# Patient Record
Sex: MALE | Race: WHITE | Employment: FULL TIME | URBAN - METROPOLITAN AREA
[De-identification: names, ages, dates, MRNs, and addresses within clinical notes are randomized per-mention and may not be internally consistent; named-entity substitution may affect disease eponyms.]

---

## 2019-11-08 ENCOUNTER — TRANSFERRED RECORDS (OUTPATIENT)
Dept: HEALTH INFORMATION MANAGEMENT | Facility: CLINIC | Age: 24
End: 2019-11-08

## 2020-10-09 ENCOUNTER — TRANSFERRED RECORDS (OUTPATIENT)
Dept: HEALTH INFORMATION MANAGEMENT | Facility: CLINIC | Age: 25
End: 2020-10-09

## 2020-10-26 ENCOUNTER — MEDICAL CORRESPONDENCE (OUTPATIENT)
Dept: HEALTH INFORMATION MANAGEMENT | Facility: CLINIC | Age: 25
End: 2020-10-26

## 2020-10-27 ENCOUNTER — TRANSCRIBE ORDERS (OUTPATIENT)
Dept: OTHER | Age: 25
End: 2020-10-27

## 2020-10-27 DIAGNOSIS — H93.232 HYPERACUSIS OF LEFT EAR: Primary | ICD-10-CM

## 2020-10-28 ENCOUNTER — TELEPHONE (OUTPATIENT)
Dept: OTOLARYNGOLOGY | Facility: CLINIC | Age: 25
End: 2020-10-28

## 2020-10-29 NOTE — TELEPHONE ENCOUNTER
FUTURE VISIT INFORMATION      FUTURE VISIT INFORMATION:    Date: 1/6/2021    Time: 10:45AM    Location: [facility]  REFERRAL INFORMATION:    Referring provider:  Pardeep Robles MD      Referring providers clinic:  [facility]     Reason for visit/diagnosis  Hyperacusis of left ear - referred to Dr. Gale by Dr. Pardeep Robles at [facility]    RECORDS REQUESTED FROM:       Clinic name Comments Records Status Imaging Status   [facility]  10/26/2020 referral from Pardeep Robles MD    10/12/2020 note from Pardeep Robles MD  Care Everywhere     Audiology [facility]   10/09/2020 audiogram  req 10/29    Received 11/5    Department of Otorhinolaryngology in [place], [place] 11/08/2019 note from Kanika Troncoso P.A.-C., M.S.    *req 11/8/2019 audio on 10/29 - received 11/9 Care Everywhere     [facility]  Imaging 10/21/2020 CT TEMPORAL BONES  Care everywhere  req 10/29 - PACS                 10/29/2020 12:35PM sent a fax to [facility] for audio and images, [place] for audio - Amay   11/4/2020 11:20AM images from [facility] in PACS, audio received but was in poor quality, sent a fax back to [facility] to fax a better quality audio - Amay   11/5/2020 9:26AM received an even worse version of the audio that was originally sent, called [facility] to see if they can fax us a better copy of the audio, ENT nurse will fax over audiogram - Amay   *audio received from clinic, also poor quality, sent both copies to scan - Amay   11/9/2020 8:16AM received [place] audio, sent to scan, closing encounter - Amay

## 2020-12-18 ENCOUNTER — TELEPHONE (OUTPATIENT)
Dept: OTOLARYNGOLOGY | Facility: CLINIC | Age: 25
End: 2020-12-18

## 2021-01-06 ENCOUNTER — PRE VISIT (OUTPATIENT)
Dept: OTOLARYNGOLOGY | Facility: CLINIC | Age: 26
End: 2021-01-06

## 2021-01-09 ENCOUNTER — HEALTH MAINTENANCE LETTER (OUTPATIENT)
Age: 26
End: 2021-01-09

## 2021-03-11 ENCOUNTER — OFFICE VISIT (OUTPATIENT)
Dept: AUDIOLOGY | Facility: CLINIC | Age: 26
End: 2021-03-11
Payer: COMMERCIAL

## 2021-03-11 DIAGNOSIS — Z01.10 EXAMINATION OF EARS AND HEARING: ICD-10-CM

## 2021-03-11 DIAGNOSIS — H93.12 TINNITUS OF LEFT EAR: Primary | ICD-10-CM

## 2021-03-11 PROCEDURE — 92550 TYMPANOMETRY & REFLEX THRESH: CPT | Performed by: AUDIOLOGIST

## 2021-03-11 PROCEDURE — 92557 COMPREHENSIVE HEARING TEST: CPT | Performed by: AUDIOLOGIST

## 2021-03-11 NOTE — PROGRESS NOTES
AUDIOLOGY REPORT    SUMMARY: Audiology visit completed. See audiogram for results.      RECOMMENDATIONS: Follow-up with ENT.        Hua Mondragon  Audiologist  [place] License  #[id number]

## 2021-03-18 ENCOUNTER — TELEPHONE (OUTPATIENT)
Dept: OTOLARYNGOLOGY | Facility: CLINIC | Age: 26
End: 2021-03-18

## 2021-03-18 ENCOUNTER — OFFICE VISIT (OUTPATIENT)
Dept: AUDIOLOGY | Facility: CLINIC | Age: 26
End: 2021-03-18
Payer: COMMERCIAL

## 2021-03-18 ENCOUNTER — OFFICE VISIT (OUTPATIENT)
Dept: OTOLARYNGOLOGY | Facility: CLINIC | Age: 26
End: 2021-03-18
Attending: OTOLARYNGOLOGY
Payer: COMMERCIAL

## 2021-03-18 VITALS
SYSTOLIC BLOOD PRESSURE: 140 MMHG | DIASTOLIC BLOOD PRESSURE: 79 MMHG | WEIGHT: 224.87 LBS | HEART RATE: 72 BPM | TEMPERATURE: 96 F | BODY MASS INDEX: 31.48 KG/M2 | OXYGEN SATURATION: 96 % | HEIGHT: 71 IN | RESPIRATION RATE: 17 BRPM

## 2021-03-18 DIAGNOSIS — H93.12 TINNITUS OF LEFT EAR: Primary | ICD-10-CM

## 2021-03-18 DIAGNOSIS — G25.3 STAPEDIAL MYOCLONUS: Primary | ICD-10-CM

## 2021-03-18 PROCEDURE — 92570 ACOUSTIC IMMITANCE TESTING: CPT | Mod: 52 | Performed by: AUDIOLOGIST

## 2021-03-18 PROCEDURE — 92504 EAR MICROSCOPY EXAMINATION: CPT | Performed by: OTOLARYNGOLOGY

## 2021-03-18 PROCEDURE — 99204 OFFICE O/P NEW MOD 45 MIN: CPT | Mod: 25 | Performed by: OTOLARYNGOLOGY

## 2021-03-18 RX ORDER — CEFAZOLIN SODIUM 2 G/50ML
2 SOLUTION INTRAVENOUS
Status: CANCELLED | OUTPATIENT
Start: 2021-03-18

## 2021-03-18 RX ORDER — DEXAMETHASONE SODIUM PHOSPHATE 4 MG/ML
10 INJECTION, SOLUTION INTRA-ARTICULAR; INTRALESIONAL; INTRAMUSCULAR; INTRAVENOUS; SOFT TISSUE ONCE
Status: CANCELLED | OUTPATIENT
Start: 2021-03-18 | End: 2021-03-18

## 2021-03-18 RX ORDER — FEXOFENADINE HCL 180 MG/1
TABLET ORAL
COMMUNITY
Start: 2020-10-12

## 2021-03-18 RX ORDER — CEFAZOLIN SODIUM 2 G/50ML
2 SOLUTION INTRAVENOUS SEE ADMIN INSTRUCTIONS
Status: CANCELLED | OUTPATIENT
Start: 2021-03-18

## 2021-03-18 SDOH — HEALTH STABILITY: MENTAL HEALTH: HOW OFTEN DO YOU HAVE A DRINK CONTAINING ALCOHOL?: MONTHLY OR LESS

## 2021-03-18 SDOH — HEALTH STABILITY: MENTAL HEALTH: HOW MANY STANDARD DRINKS CONTAINING ALCOHOL DO YOU HAVE ON A TYPICAL DAY?: NOT ASKED

## 2021-03-18 SDOH — HEALTH STABILITY: MENTAL HEALTH: HOW OFTEN DO YOU HAVE 6 OR MORE DRINKS ON ONE OCCASION?: NOT ASKED

## 2021-03-18 ASSESSMENT — MIFFLIN-ST. JEOR: SCORE: 2032.13

## 2021-03-18 ASSESSMENT — PAIN SCALES - GENERAL: PAINLEVEL: NO PAIN (0)

## 2021-03-18 NOTE — NURSING NOTE
"Chief Complaint   Patient presents with     Consult     follow up      Blood pressure (!) 140/79, pulse 72, temperature 96  F (35.6  C), resp. rate 17, height 1.803 m (5' 11\"), weight 102 kg (224 lb 13.9 oz), SpO2 96 %.    Aníbal Santiago LPN    "

## 2021-03-18 NOTE — LETTER
3/18/2021       RE: Krishna Mack  [id number]  [place] [facility] [address]     Dear Colleague,    Thank you for referring your patient, Krishna Mack, to the [facility] [place] at [facility]. Please see a copy of my visit note below.    Krishna Mack is seen in consultation from Dr. Robles.  He is a 26 year old male being seen for a crackling/rustling noise in the left ear triggered by certain sounds. He says that he thinks this started a few years ago when he fell while water skiing and hit his left ear, but he is not certain. He has been thinking back to any inciting factors and this is the only thing he can come up with. He says that the sound can be in either the right or left ear and it is not just loud sounds but certain tones/sounds. However, the sound level does need to be at least moderate. Quiet sounds will not trigger the noise. He otherwise has no otalgia or otorrhea. No sound sensitivity. No history of ear infections. No vertigo.    Physical examination:  Constitutional:  In no acute distress, appears stated age  Eyes:  Extraocular movements intact, no spontaneous nystagmus  Ears:  Both ears examined under the microscope.  Ear canals clear, TMs intact with aerated middle ears.  Respiratory:  No increased work of breathing, wheezing or stridor  Musculoskeletal:  Good upper extremity strength  Skin:  No rashes on the head and neck  Neurologic:  House Brackman 1/6 bilaterally, ambulating normally  Psychiatric:  Alert, normal affect, answering questions appropriately    Audiogram:  Audiogram from 3/11 was independently reviewed and compared to outside audiograms from 2020 and 2019. Normal hearing with a slight drop in the high frequencies but still in the normal range, 100% speech discrimination, normal tympanograms, intact reflexes at 85-90dB at 1000Hz, no reports of crackling during reflex testing. Similar hearing results in  2020 and 2019 although with slightly less of a high frequency drop in 2019. Interestingly, in 2019, reflex was absent at 1Khz bilaterally.    Outside record from [facility] was independently reviewed. Similar symptoms described with a normal exam and a diagnosis of eustachian tube dysfunction was given.    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 We discussed that it is not completely classic as he does not describe a fast rhythmic sound to almost all loud sounds but only to certain frequencies/sounds but it is quite consistent. He reports that he was finding songs on the way over to clinic today that trigger the sound. We are going to send him to Audiology to see if they can replicate the sound while recording a TM tracing. We did discuss possible laser ablation of the tendons. The risks and benefits were discussed.  The risks include but are not limited to:  Worsened hearing which may require further surgery, profound and irreversible hearing loss, dizziness, damage to the taste nerve, damage to the facial nerve, tympanic membrane perforation requiring further surgery and infection. These risk are all low. There is also a slight chance of sound sensitivity to loud sounds. Postoperative restrictions were discussed.     ADDENDUM: Audiology was able to record a rhythmic almost sinusoidal tracing in the left ear when he was using listening to music with his earbud.    We contacted him with the results and he is interested in proceeding with laser ablation of the left stapedial and tensor tympani tendons.      Again, thank you for allowing me to participate in the care of your patient.      Sincerely,    Meena Gale MD

## 2021-03-18 NOTE — PATIENT INSTRUCTIONS
1. You were seen in the ENT Clinic today by Dr. Gale.  If you have any questions or concerns after your appointment, please call   - Option 1: ENT Clinic: [phone]  - Option 2: [facility] (Dr. Glae): [phone]    Surgery Teaching      1.You must have a physical exam (called  history and physical ) within 30 days of surgery. You can do this at the PAC clinic or your family clinic.     2.For same-day surgery, you must arrange for an adult to take you home from the Center. An adult must stay with you for the first 24 hours after surgery. You cannot drive for 24 hours.     3. Ask your doctor what medicines are safe before surgery.     4. Stop drinking alcohol at least 24 hours before surgery.     5. Stop or at least cut down on smoking 24 hours before surgery.    6.Take a bath or shower the night before and the morning of surgery (as told by your surgeon). Use an antiseptic soap. If your doctor does not give you special soap, buy Hibiclens or Sima-Stat at the drug store or ask the pharmacist to suggest a brand.  Do not put on lotion, powder, perfume, deodorant or make-up after bathing.    7. You can eat a normal meal the night before surgery. Do not eat any solid foods or drink any milk products for 8 hours before surgery.     8. You may drink clear liquids until 2 hours before surgery. Clear liquids include water, Gatorade, apple juice and liquids you can read through.    9. NO MOTRIN, IBUPROFEN, ASPIRIN, ALEVE, GARLIC SUPPLEMENTS or FISH OIL x 7 days prior to surgery ( to prevent excess bleeding and bruising at time of surgery)    Dena Benavides RN  Clinical Coordinator  [facility] Otolaryngology  [phone]

## 2021-03-18 NOTE — PROGRESS NOTES
AUDIOLOGY REPORT    SUMMARY: Audiology visit completed. See audiogram for results.      RECOMMENDATIONS: Follow-up with ENT.        Avinash Degroot, CCC-A  Licensed Audiologist  [place] #[id number]

## 2021-03-18 NOTE — TELEPHONE ENCOUNTER
Spoke to the pt. Per Dr. Gale, it does look like the muscle does spasm. He could have a tendon cut/surgery. If wants to move forward we could put in orders. He is interested in moving forward. Will have Dena call the pt to go over surgery teaching over the phone with the pt. Answered his questions and will wait for a call from Dena and Dipika.    Karuna Grigsby LPN

## 2021-03-19 ENCOUNTER — TELEPHONE (OUTPATIENT)
Dept: OTOLARYNGOLOGY | Facility: CLINIC | Age: 26
End: 2021-03-19

## 2021-03-19 PROBLEM — G25.3 STAPEDIAL MYOCLONUS: Status: ACTIVE | Noted: 2021-03-19

## 2021-03-19 NOTE — TELEPHONE ENCOUNTER
Called patient to schedule surgery    Surgeon: Dr. Gale   Date of Surgery: 04/28/2021  Location of surgery: [facility]  Pre-Op H&P: PCP   Post-Op Appt Date:  3 weeks   Imaging needed:  No  Discussed COVID-19 testing:  Yes  Pre-cert/Authorization completed:  No  Packet sent out: Yes 03/19/21    Understands pre-op will call 2-3 days prior. Understands general anesthesia for the procedure, and he will need a  after surgery. He has no further questions or concerns.

## 2021-03-19 NOTE — PROGRESS NOTES
Krishna Mack is seen in consultation from Dr. Robles.  He is a 26 year old male being seen for a crackling/rustling noise in the left ear triggered by certain sounds. He says that he thinks this started a few years ago when he fell while water skiing and hit his left ear, but he is not certain. He has been thinking back to any inciting factors and this is the only thing he can come up with. He says that the sound can be in either the right or left ear and it is not just loud sounds but certain tones/sounds. However, the sound level does need to be at least moderate. Quiet sounds will not trigger the noise. He otherwise has no otalgia or otorrhea. No sound sensitivity. No history of ear infections. No vertigo.    Physical examination:  Constitutional:  In no acute distress, appears stated age  Eyes:  Extraocular movements intact, no spontaneous nystagmus  Ears:  Both ears examined under the microscope.  Ear canals clear, TMs intact with aerated middle ears.  Respiratory:  No increased work of breathing, wheezing or stridor  Musculoskeletal:  Good upper extremity strength  Skin:  No rashes on the head and neck  Neurologic:  House Brackman 1/6 bilaterally, ambulating normally  Psychiatric:  Alert, normal affect, answering questions appropriately    Audiogram:  Audiogram from 3/11 was independently reviewed and compared to outside audiograms from 2020 and 2019. Normal hearing with a slight drop in the high frequencies but still in the normal range, 100% speech discrimination, normal tympanograms, intact reflexes at 85-90dB at 1000Hz, no reports of crackling during reflex testing. Similar hearing results in 2020 and 2019 although with slightly less of a high frequency drop in 2019. Interestingly, in 2019, reflex was absent at 1Khz bilaterally.    Outside record from [facility] was independently reviewed. Similar symptoms described with a normal exam and a diagnosis of eustachian tube dysfunction was  given.    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 We discussed that it is not completely classic as he does not describe a fast rhythmic sound to almost all loud sounds but only to certain frequencies/sounds but it is quite consistent. He reports that he was finding songs on the way over to clinic today that trigger the sound. We are going to send him to Audiology to see if they can replicate the sound while recording a TM tracing. We did discuss possible laser ablation of the tendons. The risks and benefits were discussed.  The risks include but are not limited to:  Worsened hearing which may require further surgery, profound and irreversible hearing loss, dizziness, damage to the taste nerve, damage to the facial nerve, tympanic membrane perforation requiring further surgery and infection. These risk are all low. There is also a slight chance of sound sensitivity to loud sounds. Postoperative restrictions were discussed.     ADDENDUM: Audiology was able to record a rhythmic almost sinusoidal tracing in the left ear when he was using listening to music with his earbud.    We contacted him with the results and he is interested in proceeding with laser ablation of the left stapedial and tensor tympani tendons.

## 2021-04-13 DIAGNOSIS — Z11.59 ENCOUNTER FOR SCREENING FOR OTHER VIRAL DISEASES: ICD-10-CM

## 2021-05-27 ENCOUNTER — ANESTHESIA EVENT (OUTPATIENT)
Dept: SURGERY | Facility: AMBULATORY SURGERY CENTER | Age: 26
End: 2021-05-27
Payer: COMMERCIAL

## 2021-05-28 ENCOUNTER — HOSPITAL ENCOUNTER (OUTPATIENT)
Facility: AMBULATORY SURGERY CENTER | Age: 26
End: 2021-05-28
Attending: OTOLARYNGOLOGY
Payer: COMMERCIAL

## 2021-05-28 ENCOUNTER — ANESTHESIA (OUTPATIENT)
Dept: SURGERY | Facility: AMBULATORY SURGERY CENTER | Age: 26
End: 2021-05-28
Payer: COMMERCIAL

## 2021-05-28 VITALS
RESPIRATION RATE: 30 BRPM | BODY MASS INDEX: 31.36 KG/M2 | SYSTOLIC BLOOD PRESSURE: 120 MMHG | OXYGEN SATURATION: 96 % | WEIGHT: 224 LBS | DIASTOLIC BLOOD PRESSURE: 49 MMHG | TEMPERATURE: 97.7 F | HEIGHT: 71 IN | HEART RATE: 83 BPM

## 2021-05-28 DIAGNOSIS — G25.3 STAPEDIAL MYOCLONUS: ICD-10-CM

## 2021-05-28 DIAGNOSIS — G89.18 POSTOPERATIVE PAIN: Primary | ICD-10-CM

## 2021-05-28 PROCEDURE — 69440 EXPLORATION OF MIDDLE EAR: CPT | Mod: LT

## 2021-05-28 RX ORDER — ONDANSETRON 2 MG/ML
INJECTION INTRAMUSCULAR; INTRAVENOUS PRN
Status: DISCONTINUED | OUTPATIENT
Start: 2021-05-28 | End: 2021-05-28

## 2021-05-28 RX ORDER — ACETAMINOPHEN 325 MG/1
975 TABLET ORAL ONCE
Status: COMPLETED | OUTPATIENT
Start: 2021-05-28 | End: 2021-05-28

## 2021-05-28 RX ORDER — ONDANSETRON 2 MG/ML
4 INJECTION INTRAMUSCULAR; INTRAVENOUS EVERY 30 MIN PRN
Status: DISCONTINUED | OUTPATIENT
Start: 2021-05-28 | End: 2021-05-29 | Stop reason: HOSPADM

## 2021-05-28 RX ORDER — NALOXONE HYDROCHLORIDE 0.4 MG/ML
0.2 INJECTION, SOLUTION INTRAMUSCULAR; INTRAVENOUS; SUBCUTANEOUS
Status: DISCONTINUED | OUTPATIENT
Start: 2021-05-28 | End: 2021-05-29 | Stop reason: HOSPADM

## 2021-05-28 RX ORDER — CEFAZOLIN SODIUM 2 G/50ML
2 SOLUTION INTRAVENOUS SEE ADMIN INSTRUCTIONS
Status: DISCONTINUED | OUTPATIENT
Start: 2021-05-28 | End: 2021-05-29 | Stop reason: HOSPADM

## 2021-05-28 RX ORDER — SODIUM CHLORIDE, SODIUM LACTATE, POTASSIUM CHLORIDE, CALCIUM CHLORIDE 600; 310; 30; 20 MG/100ML; MG/100ML; MG/100ML; MG/100ML
INJECTION, SOLUTION INTRAVENOUS CONTINUOUS
Status: DISCONTINUED | OUTPATIENT
Start: 2021-05-28 | End: 2021-05-29 | Stop reason: HOSPADM

## 2021-05-28 RX ORDER — MEPERIDINE HYDROCHLORIDE 25 MG/ML
12.5 INJECTION INTRAMUSCULAR; INTRAVENOUS; SUBCUTANEOUS
Status: DISCONTINUED | OUTPATIENT
Start: 2021-05-28 | End: 2021-05-29 | Stop reason: HOSPADM

## 2021-05-28 RX ORDER — SODIUM CHLORIDE, SODIUM LACTATE, POTASSIUM CHLORIDE, CALCIUM CHLORIDE 600; 310; 30; 20 MG/100ML; MG/100ML; MG/100ML; MG/100ML
INJECTION, SOLUTION INTRAVENOUS CONTINUOUS PRN
Status: DISCONTINUED | OUTPATIENT
Start: 2021-05-28 | End: 2021-05-28

## 2021-05-28 RX ORDER — NALOXONE HYDROCHLORIDE 0.4 MG/ML
0.4 INJECTION, SOLUTION INTRAMUSCULAR; INTRAVENOUS; SUBCUTANEOUS
Status: DISCONTINUED | OUTPATIENT
Start: 2021-05-28 | End: 2021-05-29 | Stop reason: HOSPADM

## 2021-05-28 RX ORDER — PROPOFOL 10 MG/ML
INJECTION, EMULSION INTRAVENOUS PRN
Status: DISCONTINUED | OUTPATIENT
Start: 2021-05-28 | End: 2021-05-28

## 2021-05-28 RX ORDER — CEFAZOLIN SODIUM 2 G/50ML
2 SOLUTION INTRAVENOUS
Status: DISCONTINUED | OUTPATIENT
Start: 2021-05-28 | End: 2021-05-29 | Stop reason: HOSPADM

## 2021-05-28 RX ORDER — PROPOFOL 10 MG/ML
INJECTION, EMULSION INTRAVENOUS CONTINUOUS PRN
Status: DISCONTINUED | OUTPATIENT
Start: 2021-05-28 | End: 2021-05-28

## 2021-05-28 RX ORDER — GABAPENTIN 300 MG/1
300 CAPSULE ORAL ONCE
Status: COMPLETED | OUTPATIENT
Start: 2021-05-28 | End: 2021-05-28

## 2021-05-28 RX ORDER — ONDANSETRON 4 MG/1
4 TABLET, ORALLY DISINTEGRATING ORAL EVERY 30 MIN PRN
Status: DISCONTINUED | OUTPATIENT
Start: 2021-05-28 | End: 2021-05-29 | Stop reason: HOSPADM

## 2021-05-28 RX ORDER — OXYCODONE HYDROCHLORIDE 5 MG/1
5 TABLET ORAL EVERY 4 HOURS PRN
Status: DISCONTINUED | OUTPATIENT
Start: 2021-05-28 | End: 2021-05-29 | Stop reason: HOSPADM

## 2021-05-28 RX ORDER — OFLOXACIN 3 MG/ML
5 SOLUTION AURICULAR (OTIC) 2 TIMES DAILY
Qty: 10 ML | Refills: 0 | Status: SHIPPED | OUTPATIENT
Start: 2021-06-04 | End: 2021-06-25

## 2021-05-28 RX ORDER — GLYCOPYRROLATE 0.2 MG/ML
INJECTION, SOLUTION INTRAMUSCULAR; INTRAVENOUS PRN
Status: DISCONTINUED | OUTPATIENT
Start: 2021-05-28 | End: 2021-05-28

## 2021-05-28 RX ORDER — LIDOCAINE 40 MG/G
CREAM TOPICAL
Status: DISCONTINUED | OUTPATIENT
Start: 2021-05-28 | End: 2021-05-29 | Stop reason: HOSPADM

## 2021-05-28 RX ORDER — FENTANYL CITRATE 50 UG/ML
INJECTION, SOLUTION INTRAMUSCULAR; INTRAVENOUS PRN
Status: DISCONTINUED | OUTPATIENT
Start: 2021-05-28 | End: 2021-05-28

## 2021-05-28 RX ORDER — HYDROCODONE BITARTRATE AND ACETAMINOPHEN 5; 325 MG/1; MG/1
1 TABLET ORAL EVERY 6 HOURS PRN
Qty: 4 TABLET | Refills: 0 | Status: SHIPPED | OUTPATIENT
Start: 2021-05-28 | End: 2021-06-28

## 2021-05-28 RX ORDER — DEXAMETHASONE SODIUM PHOSPHATE 10 MG/ML
10 INJECTION, SOLUTION INTRAMUSCULAR; INTRAVENOUS ONCE
Status: COMPLETED | OUTPATIENT
Start: 2021-05-28 | End: 2021-05-28

## 2021-05-28 RX ORDER — FENTANYL CITRATE 50 UG/ML
25-50 INJECTION, SOLUTION INTRAMUSCULAR; INTRAVENOUS
Status: DISCONTINUED | OUTPATIENT
Start: 2021-05-28 | End: 2021-05-29 | Stop reason: HOSPADM

## 2021-05-28 RX ADMIN — PROPOFOL 100 MG: 10 INJECTION, EMULSION INTRAVENOUS at 09:26

## 2021-05-28 RX ADMIN — DEXAMETHASONE SODIUM PHOSPHATE 10 MG: 10 INJECTION, SOLUTION INTRAMUSCULAR; INTRAVENOUS at 09:26

## 2021-05-28 RX ADMIN — ONDANSETRON 4 MG: 2 INJECTION INTRAMUSCULAR; INTRAVENOUS at 09:26

## 2021-05-28 RX ADMIN — ACETAMINOPHEN 975 MG: 325 TABLET ORAL at 08:08

## 2021-05-28 RX ADMIN — GLYCOPYRROLATE 0.2 MG: 0.2 INJECTION, SOLUTION INTRAMUSCULAR; INTRAVENOUS at 09:08

## 2021-05-28 RX ADMIN — GABAPENTIN 300 MG: 300 CAPSULE ORAL at 08:08

## 2021-05-28 RX ADMIN — PROPOFOL 200 MG: 10 INJECTION, EMULSION INTRAVENOUS at 09:17

## 2021-05-28 RX ADMIN — PROPOFOL 200 MG: 10 INJECTION, EMULSION INTRAVENOUS at 09:08

## 2021-05-28 RX ADMIN — PROPOFOL 200 MCG/KG/MIN: 10 INJECTION, EMULSION INTRAVENOUS at 09:17

## 2021-05-28 RX ADMIN — FENTANYL CITRATE 100 MCG: 50 INJECTION, SOLUTION INTRAMUSCULAR; INTRAVENOUS at 09:24

## 2021-05-28 RX ADMIN — SODIUM CHLORIDE, SODIUM LACTATE, POTASSIUM CHLORIDE, CALCIUM CHLORIDE: 600; 310; 30; 20 INJECTION, SOLUTION INTRAVENOUS at 08:34

## 2021-05-28 RX ADMIN — CEFAZOLIN SODIUM 2 G: 2 SOLUTION INTRAVENOUS at 09:25

## 2021-05-28 ASSESSMENT — MIFFLIN-ST. JEOR: SCORE: 2028.19

## 2021-05-28 NOTE — ANESTHESIA CARE TRANSFER NOTE
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    Anesthesia Type:   General     Note:    Oropharynx: oral airway in place  Level of Consciousness: drowsy  Oxygen Supplementation: face mask  Level of Supplemental Oxygen (L/min / FiO2): 8  Independent Airway: airway patency satisfactory and stable  Dentition: dentition unchanged  Vital Signs Stable: post-procedure vital signs reviewed and stable  Report to RN Given: handoff report given  Patient transferred to: PACU  Comments: VSS and WNL, comfortable, no PONV, report to Kerrie SOUSA  Handoff Report: Identifed the Patient, Identified the Reponsible Provider, Reviewed the pertinent medical history, Discussed the surgical course, Reviewed Intra-OP anesthesia mangement and issues during anesthesia, Set expectations for post-procedure period and Allowed opportunity for questions and acknowledgement of understanding      Vitals: (Last set prior to Anesthesia Care Transfer)  CRNA VITALS  5/28/2021 0955 - 5/28/2021 1030      5/28/2021             Pulse:  100    SpO2:  95 %    Resp Rate (observed):  (!) 5    Resp Rate (set):  10        Electronically Signed By: BLANCA Butler CRNA  May 28, 2021  10:30 AM

## 2021-05-28 NOTE — DISCHARGE INSTRUCTIONS
"1. Resume your home medications. Take pain medications as indicated. Use docusate to avoid constipation. Start your ear drops in 1 week after surgery and use until your follow up.    2. Wound care  * Change the cotton ball in your ear as needed for drainage.  It's normal to expect some drainage from your ear for the first few days after surgery.    3. Use a cotton ball coated with vasoline to keep water out of ear canal.    4. For the next week, no heavy lifting more than 5 pounds, no strenuous activities. No nose blowing. Sneeze with your mouth open.    5. Please call MD or come to the ED for shortness of breath, trouble breathing, inability to tolerate liquids, intractable dizziness, or signs of infection such as fevers or redness.      Call the [phone] clinic number if you have any questions and concerns during the day and call [phone] at night and ask for \"ENT resident on call\".     6. Follow up with Dr. Gale as previously scheduled.      [facility]  Home Care Following Anesthesia  For 24 hours after surgery:  1. Get plenty of rest.  A responsible adult must stay with you for at least 24 hours after you leave the surgery center.  2. Do not drive or use heavy equipment.  If you have weakness or tingling, don't drive or use heavy equipment until this feeling goes away.   3. Do not drink alcohol.   4. Avoid strenuous or risky activities.  Ask for help when climbing stairs.  5. You may feel lightheaded.  IF so, sit for a few minutes before standing.  Have someone help you get up.   6. If you have nausea (feel sick to your stomach): Drink only clear liquids such as apple juice, ginger ale, broth or 7-Up.  Rest may also help.  Be sure to drink enough fluids.  Move to a regular diet as you feel able.   7. You may have a slight fever.  Call the doctor if your fever is over 100 F (37.7 C) (taken under the tongue) or lasts longer than 24 hours.  8. You may have a dry mouth, " a sore throat, muscle aches or trouble sleeping. These should go away after 24 hours.  9. Do not make important or legal decisions.   10. It is recommended to avoid smoking.               Tips for taking pain medications  To get the best pain relief possible, remember these points:    Take pain medications as directed, before pain becomes severe.    Pain medication can upset your stomach: taking it with food may help.    Constipation is a common side effect of pain medication. Drink plenty of  fluids.    Eat foods high in fiber. Take a stool softener if recommended by your doctor or pharmacist.    Do not drink alcohol, drive or operate machinery while taking pain medications.    Ask about other ways to control pain, such as with heat, ice or relaxation.    Tylenol/Acetaminophen Consumption  To help encourage the safe use of acetaminophen, the makers of TYLENOL  have lowered the maximum daily dose for single-ingredient Extra Strength TYLENOL  (acetaminophen) products sold in the [place] from 8 pills per day (4,000 mg) to 6 pills per day (3,000 mg). The dosing interval has also changed from 2 pills every 4-6 hours to 2 pills every 6 hours.    If you feel your pain relief is insufficient, you may take Tylenol/Acetaminophen in addition to your narcotic pain medication.     Be careful not to exceed 3,000 mg of Tylenol/Acetaminophen in a 24 hour period from all sources.    If you are taking extra strength Tylenol/acetaminophen (500 mg), the maximum dose is 6 tablets in 24 hours.    If you are taking regular strength acetaminophen (325 mg), the maximum dose is 9 tablets in 24 hours.    Call a doctor for any of the followin. Signs of infection (fever, growing tenderness at the surgery site, a large amount of drainage or bleeding, severe pain, foul-smelling drainage, redness, swelling).  2. It has been over 8 to 10 hours since surgery and you are still not able to urinate (pass water).  3. Headache for over 24  hours.  4. Numbness, tingling or weakness the day after surgery (if you had spinal anesthesia).  5. Signs of Covid-19 infection (temperature over 100 degrees, shortness of breath, cough, loss of taste/smell, generalized body aches, persistent headache, chills, sore throat, nausea/vomiting/diarrhea)  Your doctor is:  Dr. Meena Gale, ENT Otolaryngology: [phone]                    Or dial [phone] and ask for the resident on call for:  ENT Otolaryngology  For emergency care, call the:  [facility]:  [phone] (TTY for hearing impaired: [phone])

## 2021-05-28 NOTE — ANESTHESIA POSTPROCEDURE EVALUATION
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    Anesthesia Type:  General    Note:  Disposition: Outpatient   Postop Pain Control: Uneventful            Sign Out: Well controlled pain   PONV: No   Neuro/Psych: Uneventful            Sign Out: Acceptable/Baseline neuro status   Airway/Respiratory: Uneventful            Sign Out: Acceptable/Baseline resp. status   CV/Hemodynamics: Uneventful            Sign Out: Acceptable CV status; No obvious hypovolemia; No obvious fluid overload   Other NRE: NONE   DID A NON-ROUTINE EVENT OCCUR? No           Last vitals:  Vitals:    05/28/21 1030 05/28/21 1045 05/28/21 1100   BP: 112/58 112/56 120/49   Pulse: 90 80 83   Resp: 14 16 30   Temp: 36.7  C (98.1  F) 36.3  C (97.3  F) 36.5  C (97.7  F)   SpO2: 96% 98% 96%       Last vitals prior to Anesthesia Care Transfer:  CRNA VITALS  5/28/2021 0955 - 5/28/2021 1055      5/28/2021             Pulse:  100    SpO2:  95 %    Resp Rate (observed):  (!) 5    Resp Rate (set):  10          Electronically Signed By: Raz Khan MD  May 28, 2021  3:15 PM

## 2021-05-28 NOTE — ANESTHESIA PREPROCEDURE EVALUATION
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 No pertinent past medical history.   History reviewed. No pertinent surgical history.   Allergies   Allergen Reactions     Cat Hair Extract Itching     Itchy eyes          Social History     Tobacco Use     Smoking status: Never Smoker     Smokeless tobacco: Never Used   Substance Use Topics     Alcohol use: Yes     Frequency: Monthly or less      Wt Readings from Last 1 Encounters:   21 101.6 kg (224 lb)        Anesthesia Evaluation            ROS/MED HX  ENT/Pulmonary:       Neurologic:  - neg neurologic ROS     Cardiovascular:  - neg cardiovascular ROS     METS/Exercise Tolerance:     Hematologic:  - neg hematologic  ROS     Musculoskeletal:  - neg musculoskeletal ROS     GI/Hepatic:  - neg GI/hepatic ROS     Renal/Genitourinary:  - neg Renal ROS     Endo:       Psychiatric/Substance Use:       Infectious Disease:       Malignancy:       Other:            Physical Exam    Airway        Mallampati: II   TM distance: > 3 FB      Respiratory Devices and Support         Dental           Cardiovascular          Rhythm and rate: regular and normal     Pulmonary           breath sounds clear to auscultation           OUTSIDE LABS:  CBC: No results found for: WBC, HGB, HCT, PLT  BMP: No results found for: NA, POTASSIUM, CHLORIDE, CO2, BUN, CR, GLC  COAGS: No results found for: PTT, INR, FIBR  POC: No results found for: BGM, HCG, HCGS  HEPATIC: No results found for: ALBUMIN, PROTTOTAL, ALT, AST, GGT, ALKPHOS, BILITOTAL, BILIDIRECT, CRISTIANE  OTHER: No results found for: PH, LACT, A1C, WILLA, PHOS, MAG, LIPASE, AMYLASE, TSH, T4, T3, CRP, SED    Anesthesia Plan    ASA Status:  1      Anesthesia Type: General.     - Airway: LMA   Induction: Intravenous.   Maintenance: TIVA.         Consents    Anesthesia Plan(s) and associated risks, benefits, and realistic alternatives discussed. Questions answered and patient/representative(s) expressed understanding.     - Discussed with:  Patient         Postoperative Care    Pain management: Oral pain medications, Multi-modal analgesia.   PONV prophylaxis: Ondansetron (or other 5HT-3), Dexamethasone or Solumedrol     Comments:         H&P reviewed: Unable to attach H&P to encounter due to EHR limitations. H&P Update: appropriate H&P reviewed, patient examined. No interval changes since H&P (within 30 days).         Raz Khan MD

## 2021-05-30 NOTE — OP NOTE
Date of service:  5/28/21    Preoperative diagnosis:  Myoclonus    Postoperative diagnosis:  Myoclonus    Procedure:  1.  Left tympanotomy with lysis of the stapedial tendon and tensor tympani tendon with the diode laser  2.  Facial nerve monitoring x 0.75 hours    Surgeon:  Meena Gale MD    Fellow:  Gabino Shannon MD    Resident:  Charbel Agrawal MD    Anesthesia:  General    EBL:  5cc    Specimens:  None    Complications:  None    Findings:  Clean middle ear.    Indications:  Krishna Mack is a patient with left myoclonus of the tensor tympani and stapedial muscles.  Discussion was had about tympanotomy and lysis of the tendons.  Risks and benefits had been discussed and consent had been obtained.    Procedure:  The patient was taken to the operating room and placed supine on the operating room table.  General anesthesia was induced and endotracheal intubation was performed.  Time Out was then performed with confirmation of the patient, site and procedure.  Facial nerve electrodes were placed on the left side of the face.  Impedances were checked and the nerve was monitored for the entirety of the case.  1:100,000 epinephrine was injected into the ear canal.  The area was prepped and draped in standard surgical fashion.  The operating microscope was brought into position and used for the entirety of the case.  The ear canal was inspected and irrigated with normal saline.  The tympanic membrane was inspected.  It is noted to be intact with a clear middle ear. Canal incisions were made and the tympanomeatal flap elevated. The chorda tympani was preserved. The middle ear is clean and healthy. The ossicular chain is intact and mobile. The diode laser at a setting of 500mW continuous was used to lyse the stapedial and tensor tympani tendons. The tympanomeatal flap was placed back into position and gelfoam used to seal the incision.  Bacitracin was used to fill the ear canal.  A cotton ball was placed over the ear  canal and the facial nerve electrodes removed.  The patient tolerated the procedure well and counts were correct.  The patient was returned to Anesthesia, awakened, extubated and taken to the PACU in stable condition.    Meena OLMEDO MD, was scrubbed during all portions of the procedure.

## 2021-06-03 ENCOUNTER — TELEPHONE (OUTPATIENT)
Dept: OTOLARYNGOLOGY | Facility: CLINIC | Age: 26
End: 2021-06-03

## 2021-06-03 NOTE — TELEPHONE ENCOUNTER
Called patient regarding 3 week post op appt. Per patient he has 1hr 1.5 hr drive would prefer not to come early due to this. Informed patient that Dr Gale is booked Patient was scheduled.

## 2021-06-17 ENCOUNTER — PATIENT OUTREACH (OUTPATIENT)
Dept: OTOLARYNGOLOGY | Facility: CLINIC | Age: 26
End: 2021-06-17

## 2021-06-17 NOTE — PROGRESS NOTES
Rescheduled pt's post-op appointment for 6/28 at 1030. Per Dr. Gale, pt should continue using his ear drops until this appointment. Pt states understanding and agreement to the plan.

## 2021-06-24 NOTE — PATIENT INSTRUCTIONS
1. You were seen in the ENT Clinic today by Dr. Krishnan.  If you have any questions or concerns after your appointment, please call   - Option 1: ENT Clinic: [phone]   - Option 2: Karuna (Dr. Krishnan's Nurse): [phone]         Sita(Dr. Krishnan's Nurse): [phone]    2.   Plan to return to clinic in 4-6 weeks with hearing test    Karuna Grigsby LPN  [facility] - Otolaryngology

## 2021-06-28 ENCOUNTER — OFFICE VISIT (OUTPATIENT)
Dept: OTOLARYNGOLOGY | Facility: CLINIC | Age: 26
End: 2021-06-28
Payer: COMMERCIAL

## 2021-06-28 DIAGNOSIS — G25.3 STAPEDIAL MYOCLONUS: Primary | ICD-10-CM

## 2021-06-28 PROCEDURE — 99024 POSTOP FOLLOW-UP VISIT: CPT | Performed by: OTOLARYNGOLOGY

## 2021-06-28 ASSESSMENT — PAIN SCALES - GENERAL: PAINLEVEL: NO PAIN (0)

## 2021-06-28 NOTE — LETTER
6/28/2021      RE: Krishna Mack  [address]  [facility] [place] [address]       Krishna Mack is seen for his first postoperative check after laser lysis of his stapedial and tensor tympani tendons. He reports that he has been doing well after surgery. He thinks his hearing has returned to baseline and he is no longer having any clicking with certain sounds. He has noticed that loud sounds are louder in the left ear but not painful. No vertigo, no pain, no drainage.    Physical examination:  male in no acute distress.  Alert and answering questions appropriately.  HB 1/6 bilaterally.  Left ear examined under the microscope. Clot in the ear canal which was removed, ear canal well healed, TM intact, middle ear aerated.    Assessment and plan:  Healing well. We'll see him back in 4-6 weeks with an audiogram. He has no restrictions.        Meena Gale MD

## 2021-06-28 NOTE — PROGRESS NOTES
Krishna Mack is seen for his first postoperative check after laser lysis of his stapedial and tensor tympani tendons. He reports that he has been doing well after surgery. He thinks his hearing has returned to baseline and he is no longer having any clicking with certain sounds. He has noticed that loud sounds are louder in the left ear but not painful. No vertigo, no pain, no drainage.    Physical examination:  male in no acute distress.  Alert and answering questions appropriately.  HB 1/6 bilaterally.  Left ear examined under the microscope. Clot in the ear canal which was removed, ear canal well healed, TM intact, middle ear aerated.    Assessment and plan:  Healing well. We'll see him back in 4-6 weeks with an audiogram. He has no restrictions.

## 2021-07-26 ENCOUNTER — OFFICE VISIT (OUTPATIENT)
Dept: OTOLARYNGOLOGY | Facility: CLINIC | Age: 26
End: 2021-07-26
Payer: COMMERCIAL

## 2021-07-26 ENCOUNTER — OFFICE VISIT (OUTPATIENT)
Dept: AUDIOLOGY | Facility: CLINIC | Age: 26
End: 2021-07-26
Attending: OTOLARYNGOLOGY
Payer: COMMERCIAL

## 2021-07-26 VITALS
HEIGHT: 71 IN | HEART RATE: 76 BPM | BODY MASS INDEX: 30.25 KG/M2 | TEMPERATURE: 97.9 F | WEIGHT: 216.05 LBS | DIASTOLIC BLOOD PRESSURE: 81 MMHG | SYSTOLIC BLOOD PRESSURE: 129 MMHG | OXYGEN SATURATION: 96 %

## 2021-07-26 DIAGNOSIS — G25.3 STAPEDIAL MYOCLONUS: Primary | ICD-10-CM

## 2021-07-26 DIAGNOSIS — G25.3 STAPEDIAL MYOCLONUS: ICD-10-CM

## 2021-07-26 PROCEDURE — 92557 COMPREHENSIVE HEARING TEST: CPT | Performed by: AUDIOLOGIST

## 2021-07-26 PROCEDURE — 92550 TYMPANOMETRY & REFLEX THRESH: CPT | Mod: 52 | Performed by: AUDIOLOGIST

## 2021-07-26 PROCEDURE — 99024 POSTOP FOLLOW-UP VISIT: CPT | Performed by: OTOLARYNGOLOGY

## 2021-07-26 ASSESSMENT — MIFFLIN-ST. JEOR: SCORE: 1992.13

## 2021-07-26 ASSESSMENT — PAIN SCALES - GENERAL: PAINLEVEL: NO PAIN (0)

## 2021-07-26 NOTE — PATIENT INSTRUCTIONS
1. You were seen in the ENT Clinic today by Dr. Gale. If you have any questions or concerns after your appointment, please call   - Option 1: ENT Clinic: [phone]  - Option 2: Sita (Dr. Gale's Nurse): [phone]        Karuna (Dr. Gale's Nurse): [phone]     2.   Plan to return to clinic as needed    Sita RN, BSN  RN Care Coordinator  [facility]- Otolaryngology

## 2021-07-26 NOTE — LETTER
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 The patient initially presented with 'crackling/rustling noise in his left ear intermittently that is triggered by specific sounds. He previously reported that this started after hitting his left ear against the water while water skiing. At his first post-op visit, he reported doing well, including improvement in the noises in his left ear, although he did note that loud sounds seemed louder. He denied any pain, drainage, or episodes of vertigo.     Today, he reports that loud sounds are not as bothersome to him as they used to be. The rustling noise is gone. No other ear complaints.    Physical Exam:   /81   Pulse 76   Temp 97.9  F (36.6  C)   Ht 1.803 m (5' 11\")   Wt 98 kg (216 lb 0.8 oz)   SpO2 96%   BMI 30.13 kg/m       Male in no acute distress. Alert and answering questions appropriately. HB 1/6 bilaterally.  Left ear examined. Ear canal clear and healed, TM intact, middle ear aerated.    Audiogram:  He underwent an audiogram today. I independently reviewed this.   Normal hearing bilaterally- stable Re: 3/11/21. The speech reception threshold is 0 dB on the right, with 96% word recognition at 45 dB. 0 dB on the left, with 96% word recognition at 45 dB. Tympanograms showed A type on the right.         Assessment and Plan:  He continues to be doing well. We reviewed his audiogram with continued normal hearing. I encouraged him to use foam ear plugs as needed and he reported that he has noise-canceling headphones which he can use as needed. He is pleased with his results.    "   Scribe Disclosure:  I, Charo Blas, am serving as a scribe to document services personally performed by Meena Gale MD at this visit, based upon the provider's statements to me. All documentation has been reviewed by the aforementioned provider prior to being entered into the official medical record.     The documentation recorded by the scribe accurately reflects the services I personally performed and the decisions made by me.        Meena Gale MD

## 2021-07-26 NOTE — Clinical Note
"2021       RE: Krishna Mack  [address]  [facility] [address]     Dear Colleague,    Thank you for referring your patient, Krishna Mack, to the [facility] [place] at [facility]. Please see a copy of my visit note below.      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 The patient initially presented with 'crackling/rustling noise in his left ear intermittently that is triggered by specific sounds. He previously reported that this started after hitting his left ear against the water while water skiing. At his first post-op visit, he reported doing well, including improvement in the noises in his left ear. He denied any pain, drainage, or episodes of vertigo.     Today, he reports that loud sounds are not as bothersome to him as they used to be. No other ear complaints.    Physical Exam:   /81   Pulse 76   Temp 97.9  F (36.6  C)   Ht 1.803 m (5' 11\")   Wt 98 kg (216 lb 0.8 oz)   SpO2 96%   BMI 30.13 kg/m       Physical examination:  Male in no acute distress. Alert and answering questions appropriately. HB 1/6 bilaterally.  Bilateral ears examined under the otoscope.    Audiogram:  He underwent an audiogram today. I independently reviewed this.     This demonstrated:  Normal hearing bilaterally- stable Re: 3/11/21. Tymps: Right normal mobility, DNT left due to recent surgery. Reflexes: Right ipsi  present at normal level, left contra elevated, DNT left ipsi/right contra due to recent surgery.    The speech reception threshold is 0 dB on the right, with 96% word recognition at 45 dB. 0 dB on the left, with 96% word " recognition at 45 dB. Tympanograms showed A type on the right.         Assessment and Plan:  He continues to be healing well. We reviewed his audiogram that is improved. I encouraged him to use foam ear plugs as needed. He has noise-canceling headphones, which he can use as needed. He can follow up with me for any new or worsening symptoms, but he is otherwise not required to.     Follow-up: No follow-ups on file.       Scribe Disclosure:  I, Charo Odonnell, am serving as a scribe to document services personally performed by Meena Gale MD at this visit, based upon the provider's statements to me. All documentation has been reviewed by the aforementioned provider prior to being entered into the official medical record.         Again, thank you for allowing me to participate in the care of your patient.      Sincerely,    Meena Gale MD

## 2021-07-26 NOTE — NURSING NOTE
"Chief Complaint   Patient presents with     RECHECK     follow up with audio      Blood pressure 129/81, pulse 76, temperature 97.9  F (36.6  C), height 1.803 m (5' 11\"), weight 98 kg (216 lb 0.8 oz), SpO2 96 %.    Aníbal Santiago LPN    "

## 2021-07-26 NOTE — PROGRESS NOTES
AUDIOLOGY REPORT    SUMMARY: Audiology visit completed. See audiogram for results.      RECOMMENDATIONS: Follow-up with ENT.    Vincent Horner.  Licensed Audiologist  [place] # [id number]

## 2021-07-26 NOTE — PROGRESS NOTES
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 The patient initially presented with 'crackling/rustling noise in his left ear intermittently that is triggered by specific sounds. He previously reported that this started after hitting his left ear against the water while water skiing. At his first post-op visit, he reported doing well, including improvement in the noises in his left ear, although he did note that loud sounds seemed louder. He denied any pain, drainage, or episodes of vertigo.     Today, he reports that loud sounds are not as bothersome to him as they used to be. The rustling noise is gone. No other ear complaints.    Physical Exam:   /81   Pulse 76   Temp 97.9  F (36.6  C)   Ht 1.803 m (5' 11\")   Wt 98 kg (216 lb 0.8 oz)   SpO2 96%   BMI 30.13 kg/m       Male in no acute distress. Alert and answering questions appropriately. HB 1/6 bilaterally.  Left ear examined. Ear canal clear and healed, TM intact, middle ear aerated.    Audiogram:  He underwent an audiogram today. I independently reviewed this.   Normal hearing bilaterally- stable Re: 3/11/21. The speech reception threshold is 0 dB on the right, with 96% word recognition at 45 dB. 0 dB on the left, with 96% word recognition at 45 dB. Tympanograms showed A type on the right.         Assessment and Plan:  He continues to be doing well. We reviewed his audiogram with continued normal hearing. I encouraged him to use foam ear plugs as needed and he reported that he has noise-canceling headphones which he can use as needed. He is pleased with his results.      Scribe Disclosure:  I, Charo Odonnell, am serving as a scribe to " document services personally performed by Meena Gale MD at this visit, based upon the provider's statements to me. All documentation has been reviewed by the aforementioned provider prior to being entered into the official medical record.     The documentation recorded by the scribe accurately reflects the services I personally performed and the decisions made by me.

## 2021-10-11 ENCOUNTER — HEALTH MAINTENANCE LETTER (OUTPATIENT)
Age: 26
End: 2021-10-11

## 2022-01-30 ENCOUNTER — HEALTH MAINTENANCE LETTER (OUTPATIENT)
Age: 27
End: 2022-01-30

## 2022-09-24 ENCOUNTER — HEALTH MAINTENANCE LETTER (OUTPATIENT)
Age: 27
End: 2022-09-24

## 2023-05-08 ENCOUNTER — HEALTH MAINTENANCE LETTER (OUTPATIENT)
Age: 28
End: 2023-05-08

## 2023-06-27 ENCOUNTER — TRANSFERRED RECORDS (OUTPATIENT)
Dept: HEALTH INFORMATION MANAGEMENT | Facility: CLINIC | Age: 28
End: 2023-06-27
Payer: COMMERCIAL

## 2023-06-28 ENCOUNTER — TRANSCRIBE ORDERS (OUTPATIENT)
Dept: OTHER | Age: 28
End: 2023-06-28

## 2023-06-28 DIAGNOSIS — H53.2 MONOCULAR DIPLOPIA: Primary | ICD-10-CM

## 2023-07-17 ENCOUNTER — TELEPHONE (OUTPATIENT)
Dept: OPHTHALMOLOGY | Facility: CLINIC | Age: 28
End: 2023-07-17
Payer: COMMERCIAL

## 2023-07-17 NOTE — TELEPHONE ENCOUNTER
Attempted to call patient to cancel but no answer.  Patient was referred for monocular double vision.  Dr. Olmedo is unable to assist with this as it is not his area of specialty.  Will attempt to call again at a future time.     Lee Ann Ramsey on 7/17/2023 at 9:03 AM

## 2023-07-19 NOTE — TELEPHONE ENCOUNTER
Spoke to patient.  Monocular double vision is not neurologic and appointment with Dr. Olmedo would not be worthwhile.  He should talk to Dr. Amaya about other options.       Lee Ann Ramsey on 7/19/2023 at 12:19 PM

## 2024-07-14 ENCOUNTER — HEALTH MAINTENANCE LETTER (OUTPATIENT)
Age: 29
End: 2024-07-14

## (undated) DEVICE — SUCTION MANIFOLD NEPTUNE 2 SYS 4 PORT 0702-020-000

## (undated) DEVICE — NDL ANGIOCATH 14GA 1.25" 4048

## (undated) DEVICE — PREP POVIDONE-IODINE 7.5% SCRUB 4OZ BOTTLE MDS093945

## (undated) DEVICE — SOL NACL 0.9% IRRIG 500ML BOTTLE 2F7123

## (undated) DEVICE — SPONGE SURGIFOAM 100 1974

## (undated) DEVICE — BLADE KNIFE BEAVER TYMPANOPLASTY 2.5MM W/60D DOWN 377200

## (undated) DEVICE — SYR 10ML LL W/O NDL

## (undated) DEVICE — NIM ELEC SUBDERMAL NDL 3PAIR/BOX

## (undated) DEVICE — WIPE INSTRUMENT MEROCEL 400200

## (undated) DEVICE — DRSG BANDAID 1X3" FABRIC CURITY LATEX FREE KC44101

## (undated) DEVICE — PREP SKIN SCRUB TRAY 4461A

## (undated) DEVICE — DRSG COTTON BALL 6PK LCB62

## (undated) DEVICE — DRAPE SPLIT SHEET 77X108 REINFORCED 29436

## (undated) DEVICE — PACK EAR CUSTOM ASC

## (undated) DEVICE — PREP POVIDONE IODINE SOLUTION 10% 4OZ BOTTLE 29906-004

## (undated) DEVICE — DRSG TEGADERM 2 3/8X2 3/4" 1624W

## (undated) DEVICE — GLOVE PROTEXIS POWDER FREE SMT 6.5  2D72PT65X

## (undated) DEVICE — NDL 25GA 1.5" 305127

## (undated) DEVICE — LINEN TOWEL PACK X5 5464

## (undated) DEVICE — DRAPE MICROSCOPE LEICA 54X150" AR8033650

## (undated) DEVICE — SOL WATER IRRIG 500ML BOTTLE 2F7113

## (undated) DEVICE — PREP PAD ALCOHOL 6818

## (undated) RX ORDER — PROPOFOL 10 MG/ML
INJECTION, EMULSION INTRAVENOUS
Status: DISPENSED
Start: 2021-05-28

## (undated) RX ORDER — GABAPENTIN 300 MG/1
CAPSULE ORAL
Status: DISPENSED
Start: 2021-05-28

## (undated) RX ORDER — ONDANSETRON 2 MG/ML
INJECTION INTRAMUSCULAR; INTRAVENOUS
Status: DISPENSED
Start: 2021-05-28

## (undated) RX ORDER — LIDOCAINE HYDROCHLORIDE 20 MG/ML
INJECTION, SOLUTION EPIDURAL; INFILTRATION; INTRACAUDAL; PERINEURAL
Status: DISPENSED
Start: 2021-05-28

## (undated) RX ORDER — CEFAZOLIN SODIUM 2 G/50ML
SOLUTION INTRAVENOUS
Status: DISPENSED
Start: 2021-05-28

## (undated) RX ORDER — FENTANYL CITRATE-0.9 % NACL/PF 10 MCG/ML
PLASTIC BAG, INJECTION (ML) INTRAVENOUS
Status: DISPENSED
Start: 2021-05-28

## (undated) RX ORDER — ACETAMINOPHEN 325 MG/1
TABLET ORAL
Status: DISPENSED
Start: 2021-05-28

## (undated) RX ORDER — DEXAMETHASONE SODIUM PHOSPHATE 10 MG/ML
INJECTION, SOLUTION INTRAMUSCULAR; INTRAVENOUS
Status: DISPENSED
Start: 2021-05-28

## (undated) RX ORDER — FENTANYL CITRATE 50 UG/ML
INJECTION, SOLUTION INTRAMUSCULAR; INTRAVENOUS
Status: DISPENSED
Start: 2021-05-28